# Patient Record
Sex: FEMALE | Race: WHITE | NOT HISPANIC OR LATINO | URBAN - METROPOLITAN AREA
[De-identification: names, ages, dates, MRNs, and addresses within clinical notes are randomized per-mention and may not be internally consistent; named-entity substitution may affect disease eponyms.]

---

## 2022-01-29 ENCOUNTER — OFFICE VISIT (OUTPATIENT)
Dept: URGENT CARE | Facility: CLINIC | Age: 22
End: 2022-01-29
Payer: COMMERCIAL

## 2022-01-29 VITALS
WEIGHT: 220 LBS | DIASTOLIC BLOOD PRESSURE: 70 MMHG | HEIGHT: 67 IN | SYSTOLIC BLOOD PRESSURE: 110 MMHG | OXYGEN SATURATION: 96 % | BODY MASS INDEX: 34.53 KG/M2 | RESPIRATION RATE: 16 BRPM | TEMPERATURE: 98.6 F | HEART RATE: 98 BPM

## 2022-01-29 DIAGNOSIS — Z00.00 HEALTH CARE MAINTENANCE: ICD-10-CM

## 2022-01-29 DIAGNOSIS — G40.B19 INTRACTABLE JUVENILE MYOCLONIC EPILEPSY WITHOUT STATUS EPILEPTICUS (HCC): ICD-10-CM
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
## 2022-01-29 DIAGNOSIS — S39.012A LUMBAR STRAIN, INITIAL ENCOUNTER: ICD-10-CM

## 2022-01-29 PROCEDURE — 99203 OFFICE O/P NEW LOW 30 MIN: CPT | Performed by: NURSE PRACTITIONER

## 2022-01-29 RX ORDER — FLUOXETINE HYDROCHLORIDE 20 MG/1
20 CAPSULE ORAL DAILY
COMMUNITY

## 2022-01-29 RX ORDER — OXCARBAZEPINE 150 MG/1
150 TABLET, FILM COATED ORAL
COMMUNITY

## 2022-01-29 RX ORDER — LEVETIRACETAM 500 MG/1
500 TABLET ORAL
COMMUNITY

## 2022-01-29 RX ORDER — TRAZODONE HYDROCHLORIDE 50 MG/1
50 TABLET ORAL NIGHTLY
COMMUNITY
End: 2022-05-23

## 2022-01-29 RX ORDER — CYCLOBENZAPRINE HCL 5 MG
5-10 TABLET ORAL 3 TIMES DAILY PRN
Qty: 30 TABLET | Refills: 0 | Status: SHIPPED | OUTPATIENT
Start: 2022-01-29 | End: 2022-01-29

## 2022-01-29 RX ORDER — LAMOTRIGINE 250 MG/1
1 TABLET, EXTENDED RELEASE ORAL DAILY
COMMUNITY
Start: 2022-01-04 | End: 2022-04-29 | Stop reason: SDUPTHER

## 2022-01-29 RX ORDER — KETOROLAC TROMETHAMINE 30 MG/ML
30 INJECTION, SOLUTION INTRAMUSCULAR; INTRAVENOUS ONCE
Status: COMPLETED | OUTPATIENT
Start: 2022-01-29 | End: 2022-01-29

## 2022-01-29 RX ADMIN — KETOROLAC TROMETHAMINE 30 MG: 30 INJECTION, SOLUTION INTRAMUSCULAR; INTRAVENOUS at 15:37

## 2022-01-29 ASSESSMENT — ENCOUNTER SYMPTOMS
NAUSEA: 0
HEADACHES: 0
FLANK PAIN: 0
ABDOMINAL PAIN: 0
BACK PAIN: 1
SENSORY CHANGE: 0
FOCAL WEAKNESS: 0
MYALGIAS: 1
DIZZINESS: 0
VOMITING: 0
FALLS: 1
DIARRHEA: 0

## 2022-01-29 NOTE — PROGRESS NOTES
Subjective:     Yumiko Richards is a 21 y.o. female who presents for Back Injury (Patient had a siezure and fell down the stairs, lower back pain, x3 days ago  )      HPI  Pt presents for evaluation of a new problem. Yumiko is a pleasant 21-year-old female presents to urgent care today after sustaining a seizure 3 days ago while she was walking down the stairs at her.  She notes that this caused her to fall down at least 14 steps.  Her fall was witnessed.  Her seizure lasted for 2 minutes and 40 seconds.  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  She is medicated on Keppra for treatment of this.  She states that in the past she usually suffers from a seizure per year however, recently she has had 2 seizures in the last 3 months.  She is new to the [facility] and has not yet established with neurology or primary care.  She notes no head trauma, headaches, changes in vision, altered mental status or changes in vision following her fall down the stairs.  She does complain of right lower back pain and bruising from her fall.  Her pain is rated as moderate to severe.  Her pain is worse with position change, bending and lying flat.  She denies any spinal tenderness, abdominal pain or radiating pain down legs.  Negative for paresthesia.  She notes no dysuria, urgency or frequency.    Review of Systems   Gastrointestinal: Negative for abdominal pain, diarrhea, nausea and vomiting.   Genitourinary: Negative for dysuria, flank pain, frequency, hematuria and urgency.   Musculoskeletal: Positive for back pain, falls and myalgias.   Neurological: Negative for dizziness, sensory change, focal weakness and headaches.       PMH: History reviewed. No pertinent past medical history.  ALLERGIES: No Known Allergies  SURGHX: History reviewed. No pertinent surgical history.  SOCHX:   Social History     Socioeconomic History   • Marital status: Single     Spouse name: Not on file   • Number of children: Not on file  "  • Years of education: Not on file   • Highest education level: Not on file   Occupational History   • Not on file   Tobacco Use   • Smoking status: Not on file   • Smokeless tobacco: Not on file   Substance and Sexual Activity   • Alcohol use: Not on file   • Drug use: Not on file   • Sexual activity: Not on file   Other Topics Concern   • Not on file   Social History Narrative   • Not on file     Social Determinants of Health     Financial Resource Strain:    • Difficulty of Paying Living Expenses: Not on file   Food Insecurity:    • Worried About Running Out of Food in the Last Year: Not on file   • Ran Out of Food in the Last Year: Not on file   Transportation Needs:    • Lack of Transportation (Medical): Not on file   • Lack of Transportation (Non-Medical): Not on file   Physical Activity:    • Days of Exercise per Week: Not on file   • Minutes of Exercise per Session: Not on file   Stress:    • Feeling of Stress : Not on file   Social Connections:    • Frequency of Communication with Friends and Family: Not on file   • Frequency of Social Gatherings with Friends and Family: Not on file   • Attends [employer] Services: Not on file   • Active Member of Clubs or Organizations: Not on file   • Attends Club or Organization Meetings: Not on file   • Marital Status: Not on file   Intimate Partner Violence:    • Fear of Current or Ex-Partner: Not on file   • Emotionally Abused: Not on file   • Physically Abused: Not on file   • Sexually Abused: Not on file   Housing Stability:    • Unable to Pay for Housing in the Last Year: Not on file   • Number of Places Lived in the Last Year: Not on file   • Unstable Housing in the Last Year: Not on file     FH: History reviewed. No pertinent family history.      Objective:   /70 (BP Location: Left arm, Patient Position: Sitting, BP Cuff Size: Adult long)   Pulse 98   Temp 37 °C (98.6 °F) (Temporal)   Resp 16   Ht 1.702 m (5' 7\")   Wt 99.8 kg (220 lb)   SpO2 96%   " BMI 34.46 kg/m²     Physical Exam  Vitals and nursing note reviewed.   Constitutional:       General: She is not in acute distress.     Appearance: Normal appearance. She is not ill-appearing.   HENT:      Head: Normocephalic and atraumatic.      Right Ear: External ear normal.      Left Ear: External ear normal.      Nose: No congestion or rhinorrhea.      Mouth/Throat:      Mouth: Mucous membranes are moist.   Eyes:      Extraocular Movements: Extraocular movements intact.      Pupils: Pupils are equal, round, and reactive to light.   Cardiovascular:      Rate and Rhythm: Normal rate and regular rhythm.      Pulses: Normal pulses.      Heart sounds: Normal heart sounds.   Pulmonary:      Effort: Pulmonary effort is normal.      Breath sounds: Normal breath sounds.   Abdominal:      General: Abdomen is flat. Bowel sounds are normal.      Palpations: Abdomen is soft.      Tenderness: There is no abdominal tenderness. There is no right CVA tenderness or left CVA tenderness.   Musculoskeletal:      Cervical back: Normal range of motion and neck supple.      Lumbar back: Signs of trauma, spasms and tenderness present. No swelling, edema, deformity, lacerations or bony tenderness. Decreased range of motion. Positive right straight leg raise test and positive left straight leg raise test. No scoliosis.      Comments: Pain to right lower back with left and right straight leg raise.  Range of motion is limited.  She does have difficulty getting on and off exam table.  Positive for ecchymosis to right lower lumbar region.   Skin:     General: Skin is warm and dry.      Capillary Refill: Capillary refill takes less than 2 seconds.   Neurological:      General: No focal deficit present.      Mental Status: She is alert and oriented to person, place, and time. Mental status is at baseline.   Psychiatric:         Mood and Affect: Mood normal.         Behavior: Behavior normal.         Thought Content: Thought content normal.          Judgment: Judgment normal.         Assessment/Plan:   Assessment    1. Health care maintenance  Referral back to [facility]   2. 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 Lumbar strain, initial encounter  ketorolac (TORADOL) injection 30 mg    DISCONTINUED: cyclobenzaprine (FLEXERIL) 5 mg tablet   4. Fall down stairs, initial encounter  ketorolac (TORADOL) injection 30 mg    DISCONTINUED: cyclobenzaprine (FLEXERIL) 5 mg tablet   I encouraged pt to use supportive measures including rest, massage and performing stretches. Apply heat and ice for additional relief. Toradol injection given in clinic. Pt tolerated this well. Pt was advised to refrain from additional NSAIDS for the next 48 hours following this injection. Acetaminophen may be used every 4 hours as needed for additional relief of pain. Pt educated not to exceed more than advised amount on bottle.  Muscle relaxer was not prescribed as this does interfere with daily medications.  Pt verbalizes understanding and agreement to plan of care.  Follow up in clinic for worsening or persistent symptoms.    AVS handout given and reviewed with patient. Pt educated on red flags and when to seek treatment back in ER or UC.   AV block

## 2022-01-31 ENCOUNTER — TELEPHONE (OUTPATIENT)
Dept: SCHEDULING | Facility: IMAGING CENTER | Age: 22
End: 2022-01-31

## 2022-01-31 NOTE — TELEPHONE ENCOUNTER
Dear Bernadette Russo,     1. I will be headed to [facility] from 11:30 am to 1:30 pm on 2/4. Can we ensure that no patient's are scheduled during that time?    2. There are no referred entities in the appointment chart.     Let me know if you have any questions,     Savannah Russo MD, PGY1

## 2022-02-04 ENCOUNTER — OFFICE VISIT (OUTPATIENT)
Dept: INTERNAL MEDICINE | Facility: OTHER | Age: 22
End: 2022-02-04
Payer: COMMERCIAL

## 2022-02-04 VITALS
BODY MASS INDEX: 34.25 KG/M2 | TEMPERATURE: 97.6 F | HEART RATE: 71 BPM | WEIGHT: 218.2 LBS | DIASTOLIC BLOOD PRESSURE: 74 MMHG | HEIGHT: 67 IN | OXYGEN SATURATION: 98 % | SYSTOLIC BLOOD PRESSURE: 116 MMHG

## 2022-02-04 DIAGNOSIS — G40.909 SEIZURE DISORDER (HCC): ICD-10-CM

## 2022-02-04 DIAGNOSIS — Z00.00 WELLNESS EXAMINATION: ICD-10-CM

## 2022-02-04 DIAGNOSIS — F31.73 BIPOLAR DISORDER, IN PARTIAL REMISSION, MOST RECENT EPISODE MANIC (HCC): ICD-10-CM

## 2022-02-04 PROCEDURE — 99204 OFFICE O/P NEW MOD 45 MIN: CPT | Mod: GC

## 2022-02-04 ASSESSMENT — ENCOUNTER SYMPTOMS
SENSORY CHANGE: 0
DIZZINESS: 1
HEADACHES: 1
NAUSEA: 0
MUSCULOSKELETAL NEGATIVE: 1
TINGLING: 0
DEPRESSION: 0
TREMORS: 0
VOMITING: 0
CHILLS: 0
FEVER: 0
SEIZURES: 1
INSOMNIA: 1
DIARRHEA: 0
ABDOMINAL PAIN: 0
FOCAL WEAKNESS: 0
EYES NEGATIVE: 1
NERVOUS/ANXIOUS: 0
SHORTNESS OF BREATH: 0
CONSTIPATION: 0

## 2022-02-04 ASSESSMENT — PATIENT HEALTH QUESTIONNAIRE - PHQ9: CLINICAL INTERPRETATION OF PHQ2 SCORE: 0

## 2022-02-04 NOTE — PATIENT INSTRUCTIONS
1. Referral neurology, if they do not contact you, contact clinic and we will place another referral  2. Labs prior next visit

## 2022-02-04 NOTE — PROGRESS NOTES
Date of Service:  2/4/2022    CC: Establish care, recent seizure    HPI:  Yumiko Richards  is a 21 y.o. female with a PMH of  Bipolar disorder and seizure, presented after a seizure on 1/26, where she fell down a flight of stairs and hit her left lower leg and back. She had been in a lot of pain, but her pain has improved. She has a history of seizures (Juvenile monoclonic epilepsy). She had her first seizure in 2014.   Her last seizure prior to 1/26 was on 10/25/2021. Prior to this incident her seizures had been controlled. She can't remember the seizure she had prior, but it was over 8 months-1 year ago. She is unsure of any triggers. She is not photo sensitive, has no auras and her seizures appear to happen out of the blue. Seizures usually happen for 2-3 minutes. Episode on 1/26 was only 2 min 40s. In the past her seizures have lasted up to 4 minutes.   She is currently taking all of her medications. She believes that her recent seizures may be related to insomnia. She sleeps for 8 hours, but the sleep is not restful. She keeps waking up through out the night. She started a trial of trazodone prescribed by her psychiatrist. She felt horrible while taking it and continued to wake throughout the night. She has stopped taking the medication.   Patient is a [employer] student studying journalism and student affairs and currently has a job as a captioning technician. She feels that her job and school are going well.   Patient has been on Keppra since 2014 and recently had an increase to 4 pills at night. She was started on lamigtal in 2014.  She has been taking oxcarbazepine and fluoxetine for her bipolar for one year. Her bipolar is currently controlled. She has not had any manic episodes since July and no depressive episodes. She sees her psychiatrist monthly.   Patient drinks alcohol socially, but infrequently, the most she will drink is a couple of shots. She does not smoke tobacco or vape. She used to smoke  marijuana in the past.   Patient is not currently driving and does not have a drivers license.   After her fall she had lower right back pain, but it has greatly improved, only hurting when she moves a specific way. Her left lower leg is bruised from the fall, but also healing.     Discussed diet and exercise, patient has had increased activity, because of walking across campus.     Review of systems:  Review of Systems   Constitutional: Positive for malaise/fatigue (related to insomnia). Negative for chills and fever.   Eyes: Negative.    Respiratory: Negative for shortness of breath.    Cardiovascular: Negative for chest pain.   Gastrointestinal: Negative for abdominal pain, constipation, diarrhea, nausea and vomiting.   Musculoskeletal: Negative.    Neurological: Positive for dizziness (when takes trazadone ), seizures and headaches (2-3 times per week, temporal area). Negative for tingling, tremors, sensory change and focal weakness.   Psychiatric/Behavioral: Negative for depression and suicidal ideas. The patient has insomnia. The patient is not nervous/anxious.         Past Medical History:  Patient Active Problem List    Diagnosis Date Noted   • Seizure disorder ([facility]) 02/05/2022   • Wellness examination 02/05/2022   • Bipolar disorder, in partial remission, most recent episode manic ([facility]) 02/05/2022       Past Surgical History:    has no past surgical history on file.    Medications:  Current Outpatient Medications   Medication Sig Dispense Refill   • LamoTRIgine 250 MG TABLET SR 24 HR Take 1 Tablet by mouth 2 times a day.     • levETIRAcetam (KEPPRA) 500 MG Tab Take 500 mg by mouth. 3.5 tablets in the morning and 4 at night     • FLUoxetine (PROZAC) 20 MG Cap Take 40 mg by mouth every day.     • OXcarbazepine (TRILEPTAL) 150 MG Tab Take 150 mg by mouth. 2 po qam and 2 po qpm     • traZODone (DESYREL) 50 MG Tab Take 50 mg by mouth every evening. (Patient not taking: Reported on 2/4/2022)       No current  facility-administered medications for this visit.       Allergies:  No Known Allergies    Family History:   family history includes Bipolar disorder in her brother; Hypertension in her mother.     Social History:    Social History     Tobacco Use   • Smoking status: Never Smoker   • Smokeless tobacco: Never Used   Substance Use Topics   • Alcohol use: Yes     Comment: socially    • Drug use: Never     Employment: Caption technician   Activity Level: Moderate      Physical Exam:  Vitals:    02/04/22 1457   BP: 116/74   Pulse: 71   Temp: 36.4 °C (97.6 °F)   SpO2: 98%     Body mass index is 34.17 kg/m².  Physical Exam  Vitals and nursing note reviewed.   Constitutional:       General: She is not in acute distress.     Appearance: She is not ill-appearing, toxic-appearing or diaphoretic.   HENT:      Head: Normocephalic and atraumatic.      Mouth/Throat:      Mouth: Mucous membranes are moist.      Pharynx: Oropharynx is clear. No oropharyngeal exudate.   Eyes:      Extraocular Movements: Extraocular movements intact.      Conjunctiva/sclera: Conjunctivae normal.      Pupils: Pupils are equal, round, and reactive to light.   Cardiovascular:      Rate and Rhythm: Normal rate and regular rhythm.      Pulses: Normal pulses.      Heart sounds: Normal heart sounds. No murmur heard.      Pulmonary:      Effort: Pulmonary effort is normal. No respiratory distress.      Breath sounds: Normal breath sounds. No wheezing or rales.   Abdominal:      General: Abdomen is flat. Bowel sounds are normal. There is no distension.      Palpations: Abdomen is soft.      Tenderness: There is no abdominal tenderness. There is no guarding.   Musculoskeletal:         General: No swelling or deformity. Normal range of motion.      Cervical back: Normal range of motion. No rigidity.   Skin:     General: Skin is warm.      Coloration: Skin is not jaundiced.      Findings: Bruising present.      Comments: Bruising left ankle, healing    Neurological:      General: No focal deficit present.      Mental Status: She is alert and oriented to person, place, and time.   Psychiatric:         Mood and Affect: Mood normal.            Assessment/Plan:  Seizure disorder (HCC)  Patient was diagnosed with Juvenile monoclonic epilepsy in 2014. She has been on Keppra since that time, Lamotrigine was started in 2015. Patient has had a seizure on 1/26 and 10/25. There is concern that her medications will need to be adjusted. There are no known triggers to her seizures. She was referred to neurology on 1/29 by urgent care.     Plan:  1. Continue taking current medication regimen  2. Establish care with neurologist, ASAP  3. Send records from [place] to this office and to neurologist    Bipolar disorder, in partial remission, most recent episode manic (HCC)  Patient has been seeing a psychiatrist for her bipolar disorder and insomnia. She has not had any depressive or manic episodes since July 2021. She sees her psychiatrist monthly.     Plan:  1. Continue fluoxetine and oxcarbazepine  2. Follow up with psychiatrist    Wellness examination  Patient has not had lab work rececently.     Plan:  1. Labs prior to next visit  2. Follow up in 2 months           All imaging results and lab results and consult notes are reviewed at this visit.  Followup: Return in about 2 months (around 4/4/2022).    Savannah Russo MD  Internal Medicine PGY-1

## 2022-02-05 PROBLEM — G40.909 SEIZURE DISORDER (HCC): Status: ACTIVE | Noted: 2022-02-05

## 2022-02-05 PROBLEM — Z00.00 WELLNESS EXAMINATION: Status: ACTIVE | Noted: 2022-02-05

## 2022-02-05 PROBLEM — F31.73 BIPOLAR DISORDER, IN PARTIAL REMISSION, MOST RECENT EPISODE MANIC (HCC): Status: ACTIVE | Noted: 2022-02-05

## 2022-02-06 NOTE — ASSESSMENT & PLAN NOTE
Patient was diagnosed with Juvenile monoclonic epilepsy in 2014. She has been on Keppra since that time, Lamotrigine was started in 2015. Patient has had a seizure on 1/26 and 10/25. There is concern that her medications will need to be adjusted. There are no known triggers to her seizures. She was referred to neurology on 1/29 by urgent care.     Plan:  1. Continue taking current medication regimen  2. Establish care with neurologist, ASAP  3. Send records from [place] to this office and to neurologist

## 2022-02-06 NOTE — ASSESSMENT & PLAN NOTE
Patient has not had lab work rececently.     Plan:  1. Labs prior to next visit  2. Follow up in 2 months

## 2022-02-06 NOTE — ASSESSMENT & PLAN NOTE
Patient has been seeing a psychiatrist for her bipolar disorder and insomnia. She has not had any depressive or manic episodes since July 2021. She sees her psychiatrist monthly.     Plan:  1. Continue fluoxetine and oxcarbazepine  2. Follow up with psychiatrist

## 2022-04-29 ENCOUNTER — OFFICE VISIT (OUTPATIENT)
Dept: INTERNAL MEDICINE | Facility: OTHER | Age: 22
End: 2022-04-29
Payer: COMMERCIAL

## 2022-04-29 VITALS
DIASTOLIC BLOOD PRESSURE: 78 MMHG | TEMPERATURE: 99 F | OXYGEN SATURATION: 96 % | BODY MASS INDEX: 36.57 KG/M2 | SYSTOLIC BLOOD PRESSURE: 122 MMHG | HEIGHT: 67 IN | WEIGHT: 233 LBS | HEART RATE: 70 BPM

## 2022-04-29 DIAGNOSIS — L30.9 DERMATITIS: ICD-10-CM

## 2022-04-29 DIAGNOSIS — Z00.00 WELLNESS EXAMINATION: ICD-10-CM

## 2022-04-29 DIAGNOSIS — F31.73 BIPOLAR DISORDER, IN PARTIAL REMISSION, MOST RECENT EPISODE MANIC (HCC): ICD-10-CM

## 2022-04-29 DIAGNOSIS — G40.909 SEIZURE DISORDER (HCC): ICD-10-CM

## 2022-04-29 PROCEDURE — 99214 OFFICE O/P EST MOD 30 MIN: CPT | Mod: GC

## 2022-04-29 RX ORDER — LAMOTRIGINE 250 MG/1
2 TABLET, EXTENDED RELEASE ORAL DAILY
Qty: 60 TABLET | Refills: 6
Start: 2022-04-29

## 2022-04-29 NOTE — PROGRESS NOTES
Date of Service:  4/29/2022    CC: Follow-up    HPI:  Yumiko Richards  is a 21 y.o. female with a PMH of  Seizure disorder, bipolar disorder and dermatitis. Patient presents to the clinic for follow up. She has been having seizures once per month. Her appointment with neurology is on May 23rd, she has not been able to get an appointment earlier. Her seizures are usually under three minutes and she does not know when they are coming. She has fallen during some of them, but has not hit her head. Overall, her epilepsy is getting worse and she has begun to feel that she is more sensitive to light. After a seizure she is fatigued and has weakness in her arms and legs. Her seizures have caused her to miss school, and she is concerned because she has to walk all over the campus, because she does not drive. Her girlfriend is supportive, but she has been having some challenges with professors because she has missed so much class.     This does not trigger events however. She has also noted numbness in her arms and legs. She has been tested in the past, but no cause was found.   It has started again a couple of weeks ago. In the past it lasted for six months. She describes the sensation as pins and needles and shooting pain.     Her mood has been all over the place. She can't do anything currently with her bipolar medications. Her psychologist doesn't want to change any medications until she sees a neurologist. She has been having mood swings, but it is hard to tell if they are worse. She does notice some impulsivity at times and has had depressive symptoms. She has no SI/HI or thoughts about hurting herself or others. She feels fatigued and finds it hard to complete tasks such as brushing her teeth.     We discussed her current weight, patient had an eating disorder in high school and is concerned about having a relapse. Will consider referrring her to a psychologist in order to help her stay healthy without  triggering a relapse into disordered eating.     Patient also notices that she has been having break outs from lotion and creams. She would like a referral to dermatology.         Review of systems:  Review of Systems   Constitutional: Positive for malaise/fatigue. Negative for chills, diaphoresis, fever and weight loss (weight gain since high school).   HENT: Negative.    Eyes: Negative.    Respiratory: Negative.  Negative for cough and shortness of breath.    Cardiovascular: Negative for chest pain, palpitations and claudication.   Gastrointestinal: Negative.  Negative for abdominal pain, constipation, diarrhea, heartburn, nausea and vomiting.   Musculoskeletal: Negative for back pain, joint pain, myalgias and neck pain.   Skin: Positive for rash (With skin products).   Neurological: Positive for sensory change (Numbness and tingling in arms and legs). Negative for dizziness and headaches.   Endo/Heme/Allergies: Negative.    Psychiatric/Behavioral: Positive for depression. Negative for hallucinations, memory loss, substance abuse and suicidal ideas. The patient is nervous/anxious. The patient does not have insomnia.         Past Medical History:  Patient Active Problem List    Diagnosis Date Noted   • Dermatitis 04/30/2022   • Seizure disorder (HCC) 02/05/2022   • Wellness examination 02/05/2022   • Bipolar disorder, in partial remission, most recent episode manic ([facility]) 02/05/2022       Past Surgical History:    has no past surgical history on file.    Medications:  Current Outpatient Medications   Medication Sig Dispense Refill   • LamoTRIgine 250 MG TABLET SR 24 HR Take 2 Tablets by mouth every day. 60 Tablet 6   • levETIRAcetam (KEPPRA) 500 MG Tab Take 500 mg by mouth. 3.5 tablets in the morning and 4 at night     • FLUoxetine (PROZAC) 20 MG Cap Take 20 mg by mouth every day.     • OXcarbazepine (TRILEPTAL) 150 MG Tab Take 150 mg by mouth. 2 po qam and 2 po qpm     • traZODone (DESYREL) 50 MG Tab Take 50 mg by  mouth every evening. (Patient not taking: Reported on 2/4/2022)       No current facility-administered medications for this visit.       Allergies:  No Known Allergies    Family History:   family history includes Bipolar disorder in her brother; Hypertension in her mother.     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  Physical Exam  Constitutional:       General: She is not in acute distress.     Appearance: Normal appearance. She is obese. She is not ill-appearing, toxic-appearing or diaphoretic.   HENT:      Head: Normocephalic and atraumatic.      Mouth/Throat:      Mouth: Mucous membranes are moist.      Pharynx: Oropharynx is clear. No oropharyngeal exudate or posterior oropharyngeal erythema.   Eyes:      Extraocular Movements: Extraocular movements intact.   Cardiovascular:      Rate and Rhythm: Normal rate and regular rhythm.      Pulses: Normal pulses.      Heart sounds: Normal heart sounds. No murmur heard.    No gallop.   Pulmonary:      Effort: No respiratory distress.      Breath sounds: Normal breath sounds. No wheezing or rales.   Abdominal:      General: Abdomen is flat. Bowel sounds are normal. There is no distension.      Palpations: Abdomen is soft.      Tenderness: There is no abdominal tenderness. There is no guarding.   Musculoskeletal:         General: No swelling, tenderness, deformity or signs of injury. Normal range of motion.      Cervical back: Normal range of motion.   Skin:     General: Skin is warm and dry.      Coloration: Skin is not  jaundiced.      Findings: No bruising or rash.   Neurological:      General: No focal deficit present.      Mental Status: She is alert and oriented to person, place, and time. Mental status is at baseline.      Cranial Nerves: No cranial nerve deficit.      Sensory: Sensory deficit (In hands and feet) present.      Motor: No weakness.      Gait: Gait normal.   Psychiatric:         Mood and Affect: Mood normal.         Behavior: Behavior normal.         Thought Content: Thought content normal.         Judgment: Judgment normal.          Labs:  none    Imaging:  none    Assessment/Plan:  Seizure disorder (HCC)  Patient has had increased numbers of seizures. This is very concerning, she is currently taking the maximum dosage of Keppra and Lamictal. Encouraged patient to go to the ER if she has another seizure. Concern that she is having other sensory neurological problems (numbness and tingling in hands and feet).     Plan:  -See neurologist on May 23rd  -Get labs done (CBC, CMP, B12, TSH, syphilis)  -Go to ER if another seizure occurs  -Follow up in 2 months    Bipolar disorder, in partial remission, most recent episode manic (HCC)  Patient has had some depressive and impulsivity episodes. She has been following with psychiatry. Concern that she is gaining weight, which could be related to her medication regimen.     Plan:  -Continue to see psychiatry  -Referral to psychology, discuss exercise in healthy way in relation to past eating disorder  -Continue current medications until seeing neurologist    Dermatitis  Patient has had increased break outs and rashes from different skin care products. Would like to see a dermatologist.     Plan:  -Referral to dermatology           All imaging results and lab results and consult notes are reviewed at this visit.  Followup: Return in about 2 months (around 6/29/2022).    Savannah Russo MD  Internal Medicine PGY-1

## 2022-04-29 NOTE — PATIENT INSTRUCTIONS
1. Go to lab get labs done    2. Placed referral for EEG    3. Dermatology referral    4. Psychology referral     5. If you have another seizure go to ER

## 2022-04-30 PROBLEM — L30.9 DERMATITIS: Status: ACTIVE | Noted: 2022-04-30

## 2022-04-30 ASSESSMENT — ENCOUNTER SYMPTOMS
MEMORY LOSS: 0
DIZZINESS: 0
MYALGIAS: 0
FEVER: 0
HEADACHES: 0
INSOMNIA: 0
SHORTNESS OF BREATH: 0
CONSTIPATION: 0
VOMITING: 0
NERVOUS/ANXIOUS: 1
CHILLS: 0
SENSORY CHANGE: 1
WEIGHT LOSS: 0
HEARTBURN: 0
PALPITATIONS: 0
BACK PAIN: 0
GASTROINTESTINAL NEGATIVE: 1
DEPRESSION: 1
RESPIRATORY NEGATIVE: 1
ABDOMINAL PAIN: 0
COUGH: 0
CLAUDICATION: 0
HALLUCINATIONS: 0
NECK PAIN: 0
DIARRHEA: 0
NAUSEA: 0
EYES NEGATIVE: 1
DIAPHORESIS: 0

## 2022-04-30 ASSESSMENT — LIFESTYLE VARIABLES: SUBSTANCE_ABUSE: 0

## 2022-04-30 NOTE — ASSESSMENT & PLAN NOTE
Patient has had increased numbers of seizures. This is very concerning, she is currently taking the maximum dosage of Keppra and Lamictal. Encouraged patient to go to the ER if she has another seizure. Concern that she is having other sensory neurological problems (numbness and tingling in hands and feet).     Plan:  -See neurologist on May 23rd  -Get labs done (CBC, CMP, B12, TSH, syphilis)  -Go to ER if another seizure occurs  -Follow up in 2 months

## 2022-04-30 NOTE — ASSESSMENT & PLAN NOTE
Patient has had some depressive and impulsivity episodes. She has been following with psychiatry. Concern that she is gaining weight, which could be related to her medication regimen.     Plan:  -Continue to see psychiatry  -Referral to psychology, discuss exercise in healthy way in relation to past eating disorder  -Continue current medications until seeing neurologist

## 2022-04-30 NOTE — ASSESSMENT & PLAN NOTE
Patient has had increased break outs and rashes from different skin care products. Would like to see a dermatologist.     Plan:  -Referral to dermatology

## 2022-05-23 ENCOUNTER — OFFICE VISIT (OUTPATIENT)
Dept: NEUROLOGY | Facility: MEDICAL CENTER | Age: 22
End: 2022-05-23
Attending: PSYCHIATRY & NEUROLOGY
Payer: COMMERCIAL

## 2022-05-23 VITALS
OXYGEN SATURATION: 98 % | DIASTOLIC BLOOD PRESSURE: 78 MMHG | RESPIRATION RATE: 16 BRPM | BODY MASS INDEX: 36.1 KG/M2 | SYSTOLIC BLOOD PRESSURE: 122 MMHG | TEMPERATURE: 98 F | HEIGHT: 67 IN | WEIGHT: 230 LBS | HEART RATE: 74 BPM

## 2022-05-23 DIAGNOSIS — G40.B19 INTRACTABLE JUVENILE MYOCLONIC EPILEPSY WITHOUT STATUS EPILEPTICUS (HCC): ICD-10-CM

## 2022-05-23 DIAGNOSIS — G43.009 MIGRAINE WITHOUT AURA AND WITHOUT STATUS MIGRAINOSUS, NOT INTRACTABLE: ICD-10-CM

## 2022-05-23 DIAGNOSIS — E55.9 VITAMIN D DEFICIENCY: ICD-10-CM

## 2022-05-23 PROCEDURE — 99205 OFFICE O/P NEW HI 60 MIN: CPT | Performed by: PSYCHIATRY & NEUROLOGY

## 2022-05-23 PROCEDURE — 99212 OFFICE O/P EST SF 10 MIN: CPT | Performed by: PSYCHIATRY & NEUROLOGY

## 2022-05-23 ASSESSMENT — PAIN SCALES - GENERAL: PAINLEVEL: NO PAIN

## 2022-05-23 NOTE — PROGRESS NOTES
Chief Complaint   Patient presents with   • Seizure     Patient is referred by Bozena Slaughter A.P.R.Nfor initial consult.    History of present illness:  Yumiko Richards 21 y.o. female presents today for epilepsy.   Prior patient of Dr. Barbra Zuniga ([place], [place]).  History is obtained from patient.  and Patient is accompanied by self.  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  Lives with roomates and GF.     Duration/timing: diagnosed 9/2015, last seizure 5/21/2022 one per month as of 11/2021  Context: Epilepsy: she was diagnosed with JOSEPHINE in 2015 - with time duration and postictal.  has shortened.  Same characteristics.  She will have LOC and tonic episode, groggy for 24 hours post episode. Sexually active with females. No aura.   Associated signs/symptoms: headaches behind the eyes lasting for 4 hours to < 24 hours, can be pounding, severed, 12 headache days a month, no triggers, +Photosensitivity, worse with movement, without catamenial qualities. She can get numbness in the arms legs - with negative EMG/NCS.  Denies: bladder incontinence, bowel incontinence, vision changes/loss or diplopia, weakness, numbness/tingling, history of intracranial infections, swallowing difficulties, speech disturbance, incoordination, depression, anxiety, hearing loss, hallucinations, family hx seizure, febrile seizure as child, snoring/apnea during sleep and HIV or syphilis risk factors, non compliance     Patient has tried:  -Lamictal 250mg tablets SR, 500mg daily - not changed for some time, 1st  -Keppra 1750 in the morning and 2000 in evening - 2020 last change, 2nd  -Trileptal 300mg BID -initiated about 8 months/October 2021, initiated by psychiatry  -Psychiatry - NP in [facility]    She does not have a 's license.  EEG captured seizure.  MRI normal - scan performed [facility].     Stopped about 12/2022  EtoH last drink 1/2022    Past medical history:   Past Medical  "History:   Diagnosis Date   • Anxiety    • Asthma     Childhood   • Epilepsy (HCC)        Past surgical history:   Past Surgical History:   Procedure Laterality Date   • TONSILLECTOMY         Family history:   Family History   Problem Relation Age of Onset   • Hypertension Mother    • Bipolar disorder Brother        Social history:   Tobacco Use   • Smoking status: Never Smoker   • Smokeless tobacco: Never Used   Substance and Sexual Activity   • Alcohol use: Not Currently     Comment: socially    • Drug use: Not Currently     Types: Marijuana     Comment: Prior     Current medications:   Current Outpatient Medications   Medication   • LamoTRIgine 250 MG TABLET SR 24 HR   • levETIRAcetam (KEPPRA) 500 MG Tab   • FLUoxetine (PROZAC) 20 MG Cap   • OXcarbazepine (TRILEPTAL) 150 MG Tab     No current facility-administered medications for this visit.       Medication Allergy:  No Known Allergies    ROS - per HPI    Physical examination:   Vitals:    05/23/22 1406   BP: 122/78   BP Location: Right arm   Patient Position: Sitting   BP Cuff Size: Adult   Pulse: 74   Resp: 16   Temp: 36.7 °C (98 °F)   TempSrc: Temporal   SpO2: 98%   Weight: 104 kg (230 lb)   Height: 1.702 m (5' 7\")     General: Patient in well nourished in no apparent distress.  HENT: Normocephalic, atraumatic.   Cardiovascular: No lower extremity edema.  Respiratory: Normal respiratory effort.   Psychiatric: Pleasant.     NEUROLOGICAL EXAM:   Mental status: Awake, alert and fully oriented to person, place, time and situation. Normal attention, concentration and fund of knowledge for education level.   Speech and language: Speech is fluent without errors and clear.  Cranial nerve exam:  II: Pupils are equally round and reactive to light. Visual fields are intact by confrontation.  III, IV, VI: EOMI, no diplopia, no ptosis.  V: Sensation to light touch is normal over V1-3 distributions bilaterally.  .  VII: Facial movements are symmetrical. There is no facial " "droop. .  VIII: Hearing intact to soft speech and finger rub bilaterally  IX: Palate elevates symmetrically, uvula is midline. Dysarthria is not present.  XI: Shoulder shrug are symmetrical and strong.   XII: Tongue protrudes midline.    Motor exam:  Muscle tone is normal in all 4 limbs. and No abnormal movements appreciated.    Muscle strength:     Right  Left  Deltoid   5/5  5/5      Biceps   5/5  5/5  Triceps  5/5  5/5   Wrist extensors 5/5  5/5  Wrist flexors  5/5  5/5     5/5  5/5  Interossei  5/5  5/5  Hip flexors  5/5  5/5  Quadriceps  5/5  5/5   Hamstrings  5/5  5/5  Dorsiflexors  5/5  5/5  Plantarflexors  5/5  5/5  Foot inversion  5/5  5/5  Foot eversion  5/5  5/5  NT = not tested    Sensory exam:  Intact to Light touch, Temperature and Vibration in bilateral upper and lower extremity.    Reflexes:       Right  Left  Biceps   1/4  1/4  Triceps  1/4  1/4  Brachioradialis 1/4  1/4  Knee jerk  1/4 1/4  Ankle jerk  1/4 1/4   bilateral toes are downgoing to plantar stimulation..    Coordination: shows a normal finger-nose-finger and heel to shin bilaterally.   Gait: Narrow based, stable.      ANCILLARY DATA REVIEWED:   Lab Data Review: None  Imaging: None  Records reviewed: PCP note reviewed dated April 2022.  Seizures once per month.  Duration of less than 30 minutes.  No aura.  Fatigue and weakness afterwards.  History of bipolar.  Numbness in the arms and legs with prior work-up but uncertain etiology.  Mood is \"all over the place\"        ASSESSMENT AND PLAN:    1. 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  Getting to get cognitive fog from her medications.  She would be a great candidate for VNS.  She is not on max doses of medication.  We forced medication is likely not can add significant benefit.  She does not drive.  There is no evidence of medication noncompliance, obstructive sleep apnea.  " She has since stopped her marijuana and alcohol intake without improvement.  There is no evidence of sleep deprivation.  Low suspicion for nonepileptic spells. Medication interactions are less likely. She is not on hormonal birth control. Likely not subtherapeutic given the doses.  - LAMOTRIGINE; Future   - LEVETIRACETAM (KEPPRA), S  - VITAMIN D,25 HYDROXY; Future  -CBC, CMP from primary care  -Will discuss with Leah for transfer of care when she returns from vacation   -Request prior imaging, EEG, clinic notes from [place]  -Discussed VNS and reasons for optimizing seizure control    2. Migraine without aura and without status migrainosus, not intractable: Based on clinical history.  We will need to discuss preventative therapies in the future.    3. Vitamin D deficiency: prior hx not on supplementation  -vitamin D level      FOLLOW-UP: Return for with Leah MCKEON for possible VNS.  EDUCATION AND COUNSELING:  -I personally discussed the following with the patient:   Diagnosis, prognosis, and treatment options discussed with patient at length.  , Recommend regular exercise, proper hydration, healthy diet and stress reduction. , Compliance with medications was discussed in detail. , Patient/family educated on SUDEP (Sudden Death in Epilepsy). Counseling was provided on the importance of strict medication and follow up compliance. The patient understands the risks associated with non-adherence with the medical plan as outlined, including but not limited to an increased risk for breakthrough seizures, which may contribute to injuries, disability, status epilepticus, and even death.   and Other seizure precautions were discussed at length, including but not limited to no diving, no skydiving, no unsupervised swimming, no Jacuzzi or bathing in bathtubs.     The patient/family communicates understanding of the above and agrees that due to the complexity of the diagnosis/management, results of any testing and  further recommendations will typically be discussed/made during a face to face encounter in my office. The patient and/or family further understands it is their responsibility to keep proper follow up for safe and effective management of their condition. Failure to do so, may result in discharge from [facility].     This dictation was created using voice recognition software. I have made every reasonable attempt to avoid dictation errors, but this document may contain an error not identified before finalizing. If the error changes the accuracy of the document, I would appreciate it being brought to my attention. Thank you.    Total time: 60 min  Additional non-face to face time was spent: reviewing diagnostic workup to date, reviewing/obtaining separately obtained history, counseling and educating the patient/family/caregiver, ordering medications, tests, or procedures, referring/communicating with other health professionals  and documenting clinical information in EMR      Claudia Maya MD  Neurology

## 2022-05-31 DIAGNOSIS — G40.B19 INTRACTABLE JUVENILE MYOCLONIC EPILEPSY WITHOUT STATUS EPILEPTICUS (HCC): ICD-10-CM

## 2022-06-03 DIAGNOSIS — G40.B19 INTRACTABLE JUVENILE MYOCLONIC EPILEPSY WITHOUT STATUS EPILEPTICUS (HCC): ICD-10-CM

## 2022-06-08 ENCOUNTER — TELEPHONE (OUTPATIENT)
Dept: NEUROLOGY | Facility: MEDICAL CENTER | Age: 22
End: 2022-06-08
Payer: COMMERCIAL

## 2022-06-08 NOTE — TELEPHONE ENCOUNTER
6/7/2022 Patient called cancelling EMU - wants to schedule post her appt with Neurologist in [facility]. Patient stated still coming for her 24 hour amb later this week.  Thank you,    ANTONIO Joseph.  Neurodiagnostic Supervisor  [phone]

## 2022-06-09 ENCOUNTER — NON-PROVIDER VISIT (OUTPATIENT)
Dept: NEUROLOGY | Facility: MEDICAL CENTER | Age: 22
End: 2022-06-09
Attending: STUDENT IN AN ORGANIZED HEALTH CARE EDUCATION/TRAINING PROGRAM
Payer: COMMERCIAL

## 2022-06-09 DIAGNOSIS — G40.B19 INTRACTABLE JUVENILE MYOCLONIC EPILEPSY WITHOUT STATUS EPILEPTICUS (HCC): ICD-10-CM

## 2022-06-09 PROCEDURE — 95708 EEG WO VID EA 12-26HR UNMNTR: CPT | Performed by: STUDENT IN AN ORGANIZED HEALTH CARE EDUCATION/TRAINING PROGRAM

## 2022-06-09 PROCEDURE — 95700 EEG CONT REC W/VID EEG TECH: CPT | Performed by: STUDENT IN AN ORGANIZED HEALTH CARE EDUCATION/TRAINING PROGRAM

## 2022-06-09 PROCEDURE — 95719 EEG PHYS/QHP EA INCR W/O VID: CPT | Performed by: STUDENT IN AN ORGANIZED HEALTH CARE EDUCATION/TRAINING PROGRAM

## 2022-06-09 NOTE — PROCEDURES
AMBULATORY ELECTROENCEPHALOGRAM REPORT      Referring provider:   Elie Sahni M.D.  [address]  [place] [address]      DOS: 06/09/2022       Duration of Recording: (22 hours and 57 minutes)      INDICATION:  Estrellita Richards 21 y.o. female presenting with  seizure(s)    CURRENT OUTPATIENT MEDICATION LIST:   Current Outpatient Medications   Medication Instructions   • FLUoxetine (PROZAC) 20 mg, Oral, DAILY   • LamoTRIgine 250 MG TABLET SR 24 HR 2 Tablets, Oral, DAILY   • levETIRAcetam (KEPPRA) 500 mg, Oral, 3.5 tablets in the morning and 4 at night   • OXcarbazepine (TRILEPTAL) 150 mg, Oral, 2 po qam and 2 po qpm         TECHNIQUE: The EEG was set up and taken down by a Neurodiagnostic technologist who performed education to patient and staff.   A minimum but not limited to 23 electrodes and 23 channel recording was setup and performed by Neurodiagnostic technologist.   Impedances, electrode integrity, and technical impressions were documented a minimum of every 2-24 hour period by a Neurodiagnostic Technologist and reviewed by Interpreting Physician.    DESCRIPTION OF THE RECORD:  During maximal wakefulness, the background was continuous and showed a 10 Hz posterior dominant rhythm.  Reactivity and state changes were present.  During drowsiness, theta/delta frequencies were seen.    Sleep was captured and was characterized by diffuse background delta/theta activity with a loss of myogenic artifact.  N2 sleep transients in the form of sleep spindles and vertex waves were seen in the leads over the central regions.     ACTIVATION PROCEDURES:   Hyperventilation was performed by the patient for a total of 3 minutes. The technician performing the test noted good effort. This technique did not elicited any abnormalities on EEG.  and Intermittent Photic stimulation was performed in a stepwise fashion from 1 to 30 Hz and did not elicited any abnormalities on EEG.     ICTAL AND INTERICTAL FINDINGS:    No focal or generalized epileptiform activity noted.     No regional slowing was seen during this routine study.      No seizures.    EKG: Sampling of the EKG recording did not demonstrate any abnormalities    EVENTS:  Push button events and/or ambulatory diary events:     d1 12:35:28 PM  Patient Event  d1 12:35:30 PM  lightheaded  d1 12:58:33 PM  Patient Event  d1 12:58:34 PM  Lightheaded  d1 01:13:59 PM  Patient Event  d1 01:14:01 PM  No documentation by pt  d1 01:15:48 PM  Patient Event  d1 01:15:50 PM  No documentation by pt  d1 05:26:03 PM  Patient Event  d1 05:26:05 PM  Pt reports numbness on diary  d2 08:42:32 AM  Patient Event  d2 08:42:32 AM  Patient Event  d2 08:42:33 AM  Patient Event  d2 08:42:39 AM  No documentation by pt about these 4 button presses  d2 08:42:48 AM  Patient Event    INTERPRETATION:  This is a normal ambulatory EEG recording in the awake and drowsy/sleep state(s):  -No persistent focal asymmetries seen.  -No epileptiform discharges or other epileptiform phenomena seen  -No seizures. Clinical correlation is recommended.   -Clinical Events: multiple events reported were not associated with any EEG abnormalities           Elie Sahni MD  Epilepsy and General Neurology  Department of Neurology  Clinical  of Neurology [facility] [employer].

## 2022-06-10 ENCOUNTER — NON-PROVIDER VISIT (OUTPATIENT)
Dept: NEUROLOGY | Facility: MEDICAL CENTER | Age: 22
End: 2022-06-10
Attending: STUDENT IN AN ORGANIZED HEALTH CARE EDUCATION/TRAINING PROGRAM
Payer: COMMERCIAL

## 2022-06-10 PROCEDURE — 99999 PR NO CHARGE: CPT | Performed by: STUDENT IN AN ORGANIZED HEALTH CARE EDUCATION/TRAINING PROGRAM

## 2022-10-17 ENCOUNTER — PATIENT MESSAGE (OUTPATIENT)
Dept: NEUROLOGY | Facility: MEDICAL CENTER | Age: 22
End: 2022-10-17
Payer: COMMERCIAL